# Patient Record
Sex: FEMALE | Race: WHITE | NOT HISPANIC OR LATINO | Employment: UNEMPLOYED | URBAN - METROPOLITAN AREA
[De-identification: names, ages, dates, MRNs, and addresses within clinical notes are randomized per-mention and may not be internally consistent; named-entity substitution may affect disease eponyms.]

---

## 2017-08-05 LAB — RAPID GROUP A STREP (OHS): NEGATIVE

## 2018-12-04 ENCOUNTER — HISTORICAL (OUTPATIENT)
Dept: INTERNAL MEDICINE | Facility: CLINIC | Age: 28
End: 2018-12-04

## 2018-12-04 LAB — PROGEST SERPL-MCNC: 15.81 NG/ML

## 2019-08-23 LAB
BILIRUB SERPL-MCNC: NORMAL MG/DL
BLOOD URINE, POC: NEGATIVE
CLARITY, POC UA: CLEAR
COLOR, POC UA: NORMAL
GLUCOSE UR QL STRIP: NEGATIVE
KETONES UR QL STRIP: NEGATIVE
LEUKOCYTE EST, POC UA: NEGATIVE
NITRITE, POC UA: NEGATIVE
PH, POC UA: 7
PROTEIN, POC: NORMAL
SPECIFIC GRAVITY, POC UA: 1.02
UROBILINOGEN, POC UA: NORMAL

## 2019-09-30 LAB — POC BETA-HCG (QUAL): NEGATIVE

## 2022-04-11 ENCOUNTER — HISTORICAL (OUTPATIENT)
Dept: ADMINISTRATIVE | Facility: HOSPITAL | Age: 32
End: 2022-04-11

## 2022-04-27 VITALS
HEIGHT: 65 IN | DIASTOLIC BLOOD PRESSURE: 74 MMHG | WEIGHT: 187.63 LBS | BODY MASS INDEX: 31.26 KG/M2 | SYSTOLIC BLOOD PRESSURE: 107 MMHG | OXYGEN SATURATION: 99 %

## 2022-05-21 ENCOUNTER — HOSPITAL ENCOUNTER (EMERGENCY)
Facility: HOSPITAL | Age: 32
Discharge: HOME | End: 2022-05-21
Attending: FAMILY MEDICINE
Payer: MEDICAID

## 2022-05-21 VITALS
BODY MASS INDEX: 25.34 KG/M2 | WEIGHT: 152.13 LBS | HEART RATE: 71 BPM | DIASTOLIC BLOOD PRESSURE: 88 MMHG | RESPIRATION RATE: 20 BRPM | SYSTOLIC BLOOD PRESSURE: 136 MMHG | HEIGHT: 65 IN | OXYGEN SATURATION: 99 % | TEMPERATURE: 98 F

## 2022-05-21 DIAGNOSIS — K08.89 PAIN, DENTAL: Primary | ICD-10-CM

## 2022-05-21 PROCEDURE — 99283 EMERGENCY DEPT VISIT LOW MDM: CPT | Mod: 25

## 2022-05-21 PROCEDURE — 64400 NJX AA&/STRD TRIGEMINAL NRV: CPT

## 2022-05-21 PROCEDURE — 25000003 PHARM REV CODE 250: Performed by: FAMILY MEDICINE

## 2022-05-21 RX ADMIN — BUPIVACAINE HYDROCHLORIDE AND EPINEPHRINE 1.8 ML: 5; 5 INJECTION, SOLUTION PERINEURAL at 09:05

## 2022-05-21 NOTE — DISCHARGE INSTRUCTIONS
You came into the emergency department for dental pain.  There is no signs of an infection.  We put in a numbing injection to help with your pain.  I recommend seeing an emergency dentist as soon as possible for evaluation.  Your pain will start again once the numbing medicine wears off.    Return to the emergency department for not able to eat or drink like normal, start to have pus or abnormal bleeding from the teeth, fevers that are not normal.

## 2022-05-21 NOTE — ED PROVIDER NOTES
Name: Vicky Chisholm   Age: 32 y.o.  Sex: female    Chief complaint:   Chief Complaint   Patient presents with    Dental Pain     C/o broken tooth left upper side. X 2 days.       Patient arrived with: Private  History obtained from: Patient    Subjective:   Patient with no previous medical history that presents to the emergency department for left upper dental pain.  Patient said she broke her tooth approximately 1 year ago started to have worsening pain last night.  Has not had a chance to see the dentist yet.  Denies any abnormal swelling, discharge, bleeding.  Has been able to eat like normal.  Denies fever, chills, sweats, cough, sore throat, runny nose, chest pain, shortness of breath, abdominal pain, nausea, vomiting, diarrhea, dysuria, hematuria.    History reviewed. No pertinent past medical history.  History reviewed. No pertinent surgical history.  Social History     Socioeconomic History    Marital status:    Tobacco Use    Smoking status: Never Smoker    Smokeless tobacco: Never Used   Substance and Sexual Activity    Alcohol use: Never    Drug use: Never     Review of patient's allergies indicates:  No Known Allergies     Review of Systems   Constitutional: Negative for diaphoresis and fever.   HENT: Negative for congestion and sore throat.    Eyes: Negative for pain and discharge.   Respiratory: Negative for cough and shortness of breath.    Cardiovascular: Negative for chest pain and palpitations.   Gastrointestinal: Negative for diarrhea and vomiting.   Genitourinary: Negative for dysuria and hematuria.   Musculoskeletal: Negative for back pain and myalgias.   Skin: Negative for itching and rash.   Neurological: Negative for weakness and headaches.          Objective:     Vitals:    05/21/22 0903   BP: 136/88   Pulse: 71   Resp: 20   Temp: 98.1 °F (36.7 °C)        Physical Exam  Constitutional:       Appearance: She is not toxic-appearing.   HENT:      Head: Normocephalic and atraumatic.       Mouth/Throat:     Cardiovascular:      Rate and Rhythm: Regular rhythm.      Pulses: Normal pulses.   Pulmonary:      Effort: Pulmonary effort is normal.      Breath sounds: Normal breath sounds.   Abdominal:      General: Abdomen is flat. Bowel sounds are normal.      Palpations: Abdomen is soft.   Musculoskeletal:         General: No deformity. Normal range of motion.      Cervical back: Normal range of motion and neck supple.   Skin:     General: Skin is warm and dry.   Neurological:      General: No focal deficit present.      Mental Status: She is alert and oriented to person, place, and time.   Psychiatric:         Mood and Affect: Mood normal.         Behavior: Behavior normal.          Records:  Nursing records and triage records reviewed  Prior records reviewed    Labs:  No results found for this or any previous visit (from the past 24 hour(s)).     Images:  No results found.   Imaging Results    None            Medical decision making:   Presents to the emergency department for left upper dental pain.  Patient is stable and nontoxic appearing.  Vital signs are within normal limits.  Physical exam showed no signs of a dental abscess.  Dental block was performed and on re-evaluation patient said her pain was much better.  She will follow up with an emergency dentist for re-evaluation.  We discussed return precautions and a list of these and discharge instructions.  Patient understands plan, no further questions, felt safe for discharge.      Nerve Block    Date/Time: 5/21/2022 9:31 AM  Location procedure was performed: [facility] EMERGENCY DEPARTMENT  Performed by: Nils Crane MD  Authorized by: Nils Crane MD   Pre-operative diagnosis: Dental pain  Post-operative diagnosis: Dental pain  Consent Done: Yes  Consent: Verbal consent obtained.  Consent given by: patient  Patient understanding: patient states understanding of the procedure being performed  Indications: pain relief  Body area:  face/mouth  Nerve: posterior superior alveolar  Laterality: left    Patient sedated: no  Local Anesthetic: bupivacaine 0.5% with epinephrine  Complications: No  Outcome: pain improved             Diagnosis:  Final diagnoses:  None          Nils Crane M.D.  Emergency Medicine Physician     (Please note that this chart was completed via voice to text dictation. There may be typographical errors or substitutions that are unintentional, or uncorrected. Every attempt was made to proofread the chart prior to completion. If there are any questions, please contact the physician for final clarification).       Nils Crane MD  05/21/22 3729

## 2022-05-29 ENCOUNTER — HOSPITAL ENCOUNTER (EMERGENCY)
Facility: HOSPITAL | Age: 32
Discharge: HOME | End: 2022-05-29
Attending: EMERGENCY MEDICINE
Payer: MEDICAID

## 2022-05-29 VITALS
OXYGEN SATURATION: 100 % | HEART RATE: 87 BPM | RESPIRATION RATE: 18 BRPM | BODY MASS INDEX: 24.99 KG/M2 | DIASTOLIC BLOOD PRESSURE: 92 MMHG | WEIGHT: 150 LBS | SYSTOLIC BLOOD PRESSURE: 111 MMHG | TEMPERATURE: 99 F | HEIGHT: 65 IN

## 2022-05-29 DIAGNOSIS — O03.9 MISCARRIAGE: Primary | ICD-10-CM

## 2022-05-29 LAB
ALBUMIN SERPL-MCNC: 4 GM/DL (ref 3.5–5)
ALBUMIN/GLOB SERPL: 1.4 RATIO (ref 1.1–2)
ALP SERPL-CCNC: 57 UNIT/L (ref 40–150)
ALT SERPL-CCNC: 12 UNIT/L (ref 0–55)
APPEARANCE UR: CLEAR
AST SERPL-CCNC: 14 UNIT/L (ref 5–34)
B-HCG FREE SERPL-ACNC: 5.57 MIU/ML
B-HCG UR QL: NEGATIVE
BACTERIA #/AREA URNS AUTO: NORMAL /HPF
BASOPHILS # BLD AUTO: 0.06 X10(3)/MCL (ref 0–0.2)
BASOPHILS NFR BLD AUTO: 0.8 %
BILIRUB UR QL STRIP.AUTO: NEGATIVE MG/DL
BILIRUBIN DIRECT+TOT PNL SERPL-MCNC: 0.4 MG/DL
BUN SERPL-MCNC: 7.9 MG/DL (ref 7–18.7)
CALCIUM SERPL-MCNC: 9.2 MG/DL (ref 8.4–10.2)
CHLORIDE SERPL-SCNC: 107 MMOL/L (ref 98–107)
CO2 SERPL-SCNC: 25 MMOL/L (ref 22–29)
COLOR UR AUTO: YELLOW
CREAT SERPL-MCNC: 0.67 MG/DL (ref 0.55–1.02)
CTP QC/QA: YES
EOSINOPHIL # BLD AUTO: 0.2 X10(3)/MCL (ref 0–0.9)
EOSINOPHIL NFR BLD AUTO: 2.6 %
ERYTHROCYTE [DISTWIDTH] IN BLOOD BY AUTOMATED COUNT: 13.2 % (ref 11.5–17)
GLOBULIN SER-MCNC: 2.9 GM/DL (ref 2.4–3.5)
GLUCOSE SERPL-MCNC: 91 MG/DL (ref 74–100)
GLUCOSE UR QL STRIP.AUTO: NEGATIVE MG/DL
GROUP & RH: NORMAL
HCT VFR BLD AUTO: 41.7 % (ref 37–47)
HGB BLD-MCNC: 13.2 GM/DL (ref 12–16)
IMM GRANULOCYTES # BLD AUTO: 0.05 X10(3)/MCL (ref 0–0.02)
IMM GRANULOCYTES NFR BLD AUTO: 0.7 % (ref 0–0.43)
KETONES UR QL STRIP.AUTO: NEGATIVE MG/DL
LEUKOCYTE ESTERASE UR QL STRIP.AUTO: NEGATIVE UNIT/L
LYMPHOCYTES # BLD AUTO: 2.06 X10(3)/MCL (ref 0.6–4.6)
LYMPHOCYTES NFR BLD AUTO: 26.9 %
MCH RBC QN AUTO: 29.9 PG (ref 27–31)
MCHC RBC AUTO-ENTMCNC: 31.7 MG/DL (ref 33–36)
MCV RBC AUTO: 94.6 FL (ref 80–94)
MONOCYTES # BLD AUTO: 0.64 X10(3)/MCL (ref 0.1–1.3)
MONOCYTES NFR BLD AUTO: 8.4 %
NEUTROPHILS # BLD AUTO: 4.6 X10(3)/MCL (ref 2.1–9.2)
NEUTROPHILS NFR BLD AUTO: 60.6 %
NITRITE UR QL STRIP.AUTO: NEGATIVE
NRBC BLD AUTO-RTO: 0 %
PH UR STRIP.AUTO: 6.5 [PH]
PLATELET # BLD AUTO: 321 X10(3)/MCL (ref 130–400)
PMV BLD AUTO: 9.9 FL (ref 9.4–12.4)
POTASSIUM SERPL-SCNC: 4.2 MMOL/L (ref 3.5–5.1)
PROT SERPL-MCNC: 6.9 GM/DL (ref 6.4–8.3)
PROT UR QL STRIP.AUTO: NEGATIVE MG/DL
RBC # BLD AUTO: 4.41 X10(6)/MCL (ref 4.2–5.4)
RBC #/AREA URNS AUTO: <5 /HPF
RBC UR QL AUTO: ABNORMAL UNIT/L
SODIUM SERPL-SCNC: 140 MMOL/L (ref 136–145)
SP GR UR STRIP.AUTO: 1.02 (ref 1–1.03)
SQUAMOUS #/AREA URNS AUTO: <4 /LPF
UROBILINOGEN UR STRIP-ACNC: 0.2 MG/DL
WBC # SPEC AUTO: 7.7 X10(3)/MCL (ref 4.5–11.5)
WBC #/AREA URNS AUTO: <5 /HPF

## 2022-05-29 PROCEDURE — 81025 URINE PREGNANCY TEST: CPT | Performed by: PHYSICIAN ASSISTANT

## 2022-05-29 PROCEDURE — 36415 COLL VENOUS BLD VENIPUNCTURE: CPT | Performed by: PHYSICIAN ASSISTANT

## 2022-05-29 PROCEDURE — 85025 COMPLETE CBC W/AUTO DIFF WBC: CPT | Performed by: PHYSICIAN ASSISTANT

## 2022-05-29 PROCEDURE — 81001 URINALYSIS AUTO W/SCOPE: CPT | Performed by: PHYSICIAN ASSISTANT

## 2022-05-29 PROCEDURE — 80053 COMPREHEN METABOLIC PANEL: CPT | Performed by: PHYSICIAN ASSISTANT

## 2022-05-29 PROCEDURE — 36415 COLL VENOUS BLD VENIPUNCTURE: CPT | Performed by: EMERGENCY MEDICINE

## 2022-05-29 PROCEDURE — 99283 EMERGENCY DEPT VISIT LOW MDM: CPT | Mod: 25

## 2022-05-29 PROCEDURE — 86901 BLOOD TYPING SEROLOGIC RH(D): CPT | Performed by: PHYSICIAN ASSISTANT

## 2022-05-29 NOTE — FIRST PROVIDER EVALUATION
"Medical screening exam completed.  I have conducted a focused provider triage encounter, findings are as follows:    Brief history of present illness:  32 year old female presents to ED for vaginal bleeding that began today; Reports positive home pregnancy test; LMP 4/29; reports beta hcg yesterday of 11 through lab [facility]rik[facility]; Denies abdominal pain; OB Dr. Kiarra Rabago:    05/29/22 1616   Weight: 68 kg (150 lb)   Height: 5' 5" (1.651 m)       Pertinent physical exam:  Awake, alert    Brief workup plan:  Labs, UA    Preliminary workup initiated; this workup will be continued and followed by the physician or advanced practice provider that is assigned to the patient when roomed.  "

## 2022-05-30 NOTE — ED PROVIDER NOTES
Encounter Date: 2022       History     Chief Complaint   Patient presents with    Vaginal Bleeding     Patient reports vaginal bleeding for 30 minutes, reports recent preg, denies abd pain     Patient presents with:  Vaginal Bleeding: Patient reports vaginal bleeding for 30 minutes, reports recent preg, denies abd pain    I Rodrigo MONTOYA assumed care of pt at 2300,  AB1 female who reports last menstrual period 4 weeks ago having positive home pregnancy test and established with OBGYN outpatient labs beta hCG 1 week ago 27 repeated 5 days ago highest level beta-hCG 41 states repeated BT hCG yesterday 11 started on progesterone in the last 3 days but having vaginal blood when wiping while sitting on toilet tonight presents to emergency department for evaluation.    The history is provided by the patient and a parent. No  was used.   Vaginal Bleeding  This is a new problem. The current episode started less than 1 hour ago. Pertinent negatives include no chest pain, no abdominal pain and no shortness of breath.     Review of patient's allergies indicates:  No Known Allergies  No past medical history on file.  No past surgical history on file.  No family history on file.  Social History     Tobacco Use    Smoking status: Never Smoker    Smokeless tobacco: Never Used   Substance Use Topics    Alcohol use: Never    Drug use: Never     Review of Systems   Constitutional: Negative for chills, fatigue and fever.   Respiratory: Negative for shortness of breath.    Cardiovascular: Negative for chest pain and palpitations.   Gastrointestinal: Negative for abdominal pain, blood in stool, diarrhea, nausea and vomiting.   Endocrine: Negative.    Genitourinary: Positive for vaginal bleeding. Negative for dysuria.   Musculoskeletal: Negative.    Skin: Negative.    Neurological: Negative.    Psychiatric/Behavioral: Negative.        Physical Exam     Initial Vitals [22 1617]   BP Pulse Resp  Temp SpO2   121/79 98 18 98.1 °F (36.7 °C) 100 %      MAP       --         Physical Exam    Vitals reviewed.  Constitutional: She appears well-developed and well-nourished. She is not diaphoretic. She is active.  Non-toxic appearance. No distress.   HENT:   Head: Normocephalic.   Neck: Trachea normal. Neck supple.   Normal range of motion.  Cardiovascular: Normal rate, regular rhythm and normal pulses.   Pulmonary/Chest: No respiratory distress.   Abdominal: Abdomen is soft. She exhibits no distension. There is no abdominal tenderness.   Musculoskeletal:         General: Normal range of motion.      Right upper arm: Normal.      Left upper arm: Normal.      Cervical back: Normal, normal range of motion and neck supple.      Thoracic back: Normal.      Lumbar back: Normal.      Right upper leg: Normal.      Left upper leg: Normal.      Right lower leg: Normal.      Left lower leg: Normal.     Neurological: She is alert and oriented to person, place, and time. She has normal strength.   Skin: Skin is warm, dry and intact.   Psychiatric: She has a normal mood and affect.         ED Course   Procedures  Labs Reviewed   HCG, QUANTITATIVE - Abnormal; Notable for the following components:       Result Value    Beta Human Chorionic Gonadotropin Quantitative 5.57 (*)     All other components within normal limits   URINALYSIS, REFLEX TO URINE CULTURE - Abnormal; Notable for the following components:    Blood, UA 1+ (*)     All other components within normal limits   CBC WITH DIFFERENTIAL - Abnormal; Notable for the following components:    MCV 94.6 (*)     MCHC 31.7 (*)     IG# 0.05 (*)     IG% 0.7 (*)     All other components within normal limits   URINALYSIS, MICROSCOPIC - Normal   CBC W/ AUTO DIFFERENTIAL    Narrative:     The following orders were created for panel order CBC Auto Differential.  Procedure                               Abnormality         Status                     ---------                                -----------         ------                     CBC with Differential[[id number]]        Abnormal            Final result                 Please view results for these tests on the individual orders.   COMPREHENSIVE METABOLIC PANEL   POCT URINE PREGNANCY   GROUP & RH   ABORH RETYPE          Imaging Results    None          Medications - No data to display              ED Course as of 05/29/22 2316   [place] May 29, 2022   2314 Patient resting in bed attended by mother.  Patient calm understands lab results concern for downward trending beta HCG concern for miscarriage.  Patient verbalized understanding ready for discharge now states will follow-up with OBGYN for continued care. [TQ]   2315 HCG, Quantitative(!) [TQ]   2315 Comprehensive Metabolic Panel [TQ]   2315 CBC Auto Differential(!) [TQ]   2315 ABO/Rh [TQ]   2315 Urinalysis, Reflex to Urine Culture Urine, Clean Catch(!) [TQ]      ED Course User Index  [TQ] JAN Marie             Clinical Impression:   Final diagnoses:  [O03.9] Miscarriage (Primary)          ED Disposition Condition    Discharge Stable        ED Prescriptions     None        Follow-up Information     Follow up With Specialties Details Why Contact Info    Marcell Plunkett MD Obstetrics and Gynecology In 2 days  [address] [facility]  [address]  [facility] [address]  [phone]             JAN Marie  05/29/22 2316

## 2022-09-21 ENCOUNTER — HISTORICAL (OUTPATIENT)
Dept: ADMINISTRATIVE | Facility: HOSPITAL | Age: 32
End: 2022-09-21
Payer: MEDICAID

## 2024-05-02 ENCOUNTER — APPOINTMENT (OUTPATIENT)
Dept: LAB | Facility: HOSPITAL | Age: 34
End: 2024-05-02
Attending: STUDENT IN AN ORGANIZED HEALTH CARE EDUCATION/TRAINING PROGRAM
Payer: MEDICAID

## 2024-05-02 DIAGNOSIS — N91.2 ABSENCE OF MENSTRUATION: Primary | ICD-10-CM

## 2024-05-02 LAB — B-HCG FREE SERPL-ACNC: <2.42 MIU/ML

## 2024-05-02 PROCEDURE — 84702 CHORIONIC GONADOTROPIN TEST: CPT

## 2024-05-02 PROCEDURE — 36415 COLL VENOUS BLD VENIPUNCTURE: CPT
